# Patient Record
(demographics unavailable — no encounter records)

---

## 2024-10-24 NOTE — PLAN
[FreeTextEntry1] : Positive urine pregnancy test   - POCT positive   Pregnancy at early stage   - GC obtained  - Bedside sonogram showing IUP. Gestational sac, yolk sac and start of fetal pole visualized.  - Repeat sonogram in 2 weeks for reconfirmation.   F/U in 2 weeks  All questions and concerns addressed during encounter. Pt. agreed to plan of care.

## 2024-10-24 NOTE — PHYSICAL EXAM
[Chaperone Present] : A chaperone was present in the examining room during all aspects of the physical examination [13620] : A chaperone was present during the pelvic exam. [Appropriately responsive] : appropriately responsive [Alert] : alert [No Acute Distress] : no acute distress [No Lymphadenopathy] : no lymphadenopathy [Regular Rate Rhythm] : regular rate rhythm [No Murmurs] : no murmurs [Clear to Auscultation B/L] : clear to auscultation bilaterally [Soft] : soft [Non-tender] : non-tender [Non-distended] : non-distended [No HSM] : No HSM [No Lesions] : no lesions [No Mass] : no mass [Oriented x3] : oriented x3 [Examination Of The Breasts] : a normal appearance [No Masses] : no breast masses were palpable [Labia Majora] : normal [Labia Minora] : normal [Normal] : normal [Uterine Adnexae] : normal

## 2024-10-24 NOTE — HISTORY OF PRESENT ILLNESS
[FreeTextEntry1] : 34 year old female presents for initial evaluation. Pt. had a positive home pregnancy test. She states she has regular menstrual cycles that occur every 28-30 days. No bleeding/cramping and no complaints today.  [N] : Patient does not use contraception [Y] : Patient is sexually active [PGxTotal] : 1 [PGHxFullTerm] : 0 [PGHxPremature] : 0 [PGHxAbortions] : 0 [[facility]] : 0 [PGHxABInduced] : 0 [PGHxABSpont] : 0 [PGHxEctopic] : 0 [PGHxMultBirths] : 0 [Frequency: Q ___ days] : menstrual periods occur approximately every [unfilled] days

## 2024-10-24 NOTE — PROCEDURE
[GC Chlamydia Culture] : GC Chlamydia Culture [Tolerated Well] : the patient tolerated the procedure well [No Complications] : there were no complications [Transvaginal OB Sonogram] : Transvaginal OB Sonogram [Intrauterine Pregnancy] : intrauterine pregnancy [Yolk Sac] : yolk sac present [Transvaginal OB Sonogram WNL] : Transvaginal OB Sonogram WNL